# Patient Record
(demographics unavailable — no encounter records)

---

## 2024-10-23 NOTE — ASSESSMENT
[FreeTextEntry1] : sn loss stable au aids cerumen cleared  20 minutes spent with patient with exam and counseling excluding time for any procedure.

## 2024-10-23 NOTE — HISTORY OF PRESENT ILLNESS
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

## 2024-10-30 NOTE — HEALTH RISK ASSESSMENT
[1 or 2 (0 pts)] : 1 or 2 (0 points) [Never (0 pts)] : Never (0 points) [No] : In the past 12 months have you used drugs other than those required for medical reasons? No [No falls in past year] : Patient reported no falls in the past year [0] : 2) Feeling down, depressed, or hopeless: Not at all (0) [PHQ-2 Negative - No further assessment needed] : PHQ-2 Negative - No further assessment needed [Never] : Never [NO] : No [Patient reported mammogram was normal] : Patient reported mammogram was normal [HIV test declined] : HIV test declined [Hepatitis C test declined] : Hepatitis C test declined [Alone] : lives alone [Retired] : retired [College] : College [] :  [# Of Children ___] : has [unfilled] children [Feels Safe at Home] : Feels safe at home [Fully functional (bathing, dressing, toileting, transferring, walking, feeding)] : Fully functional (bathing, dressing, toileting, transferring, walking, feeding) [Fully functional (using the telephone, shopping, preparing meals, housekeeping, doing laundry, using] : Fully functional and needs no help or supervision to perform IADLs (using the telephone, shopping, preparing meals, housekeeping, doing laundry, using transportation, managing medications and managing finances) [Reports changes in hearing] : Reports changes in hearing [Reports normal functional visual acuity (ie: able to read med bottle)] : Reports normal functional visual acuity [Smoke Detector] : smoke detector [Carbon Monoxide Detector] : carbon monoxide detector [Seat Belt] :  uses seat belt [Sunscreen] : uses sunscreen [Very Good] : ~his/her~  mood as very good [Audit-CScore] : 0 [PXT3Omthe] : 0 [Change in mental status noted] : No change in mental status noted [Language] : denies difficulty with language [Behavior] : denies difficulty with behavior [Learning/Retaining New Information] : denies difficulty learning/retaining new information [Handling Complex Tasks] : denies difficulty handling complex tasks [Reasoning] : denies difficulty with reasoning [Spatial Ability and Orientation] : denies difficulty with spatial ability and orientation [Sexually Active] : not sexually active [Reports changes in vision] : Reports no changes in vision [Reports changes in dental health] : Reports no changes in dental health [Travel to Developing Areas] : does not  travel to developing areas [TB Exposure] : is not being exposed to tuberculosis [Caregiver Concerns] : does not have caregiver concerns [MammogramDate] : 04/24 [PapSmearComments] : unsure [ColonoscopyComments] : unsure

## 2025-01-08 NOTE — ASSESSMENT
[FreeTextEntry1] : sn loss stable au aids cerumen cleared f/u 2 mo  20 minutes spent with patient with exam and counseling excluding time for any procedure.

## 2025-01-08 NOTE — REVIEW OF SYSTEMS
[Patient Intake Form Reviewed] : Patient intake form was reviewed [Negative] : Ear [de-identified] : wax build up

## 2025-02-18 NOTE — CONSULT LETTER
[Dear  ___] : Dear  [unfilled], [Consult Letter:] : I had the pleasure of evaluating your patient, [unfilled]. [Sincerely,] : Sincerely, [DrMarimar  ___] : Dr. COURTNEY

## 2025-02-20 NOTE — HISTORY OF PRESENT ILLNESS
[FreeTextEntry1] : This is a 92 year old  female who was diagnosed with right breast cancer at age 52.  She states it was extensive intraductal, but "had broken into the tissue."  She had a mastectomy with removal of lymph nodes and an expander reconstruction at [facility] in [place].  The expander was subsequently exchanged to a silicone implant.  She believes it was a textured implant. She did not receive any additional therapy.  She has also had a left breast biopsy for a benign lump in the past.  She was seen in Dec 2019 for a first visit.  She had noticed a change in the appearance of the right implant in the preceding 2 years and complained of a bulge in the lower inner breast. Ultrasound showed the right implant to likely be ruptured (could not tolerate MRI).  There was also a nodule in the left breast at 4-6:00 and an FNA was performed with benign results.  She had the right implant exchanged by Dr. Hardy.  She has a saline implant.    She had a left mammogram and ultrasound in April.  A six month follow-up was advised for a new probable debris filled cyst in the left breast.

## 2025-02-20 NOTE — DATA REVIEWED
[FreeTextEntry1] : Left mammogram ([facility])  4/8/2024:  Dense. Benign appearing nodularity unchanged.  Left breast ultrasound 4/8/2024:  Left 11N2 6 mm probable debris filled cyst- follow-up in 6 months. 4N2 oval hypoechoic mass 8 mm unchanged.   Left breast ultrasound 10/03/2024:  Left 11N2 contiguous intramammary lymph nodes 6x6x2 mm.

## 2025-02-20 NOTE — PHYSICAL EXAM
[EOMI] : extra ocular movement intact [Sclera nonicteric] : sclera nonicteric [Supple] : supple [No Supraclavicular Adenopathy] : no supraclavicular adenopathy [No Cervical Adenopathy] : no cervical adenopathy [Clear to Auscultation Bilat] : clear to auscultation bilaterally [Examined in the supine and seated position] : examined in the supine and seated position [No dominant masses] : no dominant masses in right breast  [No dominant masses] : no dominant masses left breast [No Nipple Retraction] : no left nipple retraction [No Nipple Discharge] : no left nipple discharge [No Axillary Lymphadenopathy] : no left axillary lymphadenopathy [Soft] : abdomen soft [Not Tender] : non-tender [No Palpable Masses] : no abdominal mass palpated [de-identified] : Low transverse neck scar. [de-identified] : Inverted T scar.  Implant. 1.5 cm round bulge in central implant. [de-identified] : Multiple seborrheic keratoses. [de-identified] : Axillary scar

## 2025-02-20 NOTE — PHYSICAL EXAM
[EOMI] : extra ocular movement intact [Sclera nonicteric] : sclera nonicteric [Supple] : supple [No Supraclavicular Adenopathy] : no supraclavicular adenopathy [No Cervical Adenopathy] : no cervical adenopathy [Clear to Auscultation Bilat] : clear to auscultation bilaterally [Examined in the supine and seated position] : examined in the supine and seated position [No dominant masses] : no dominant masses in right breast  [No dominant masses] : no dominant masses left breast [No Nipple Retraction] : no left nipple retraction [No Nipple Discharge] : no left nipple discharge [No Axillary Lymphadenopathy] : no left axillary lymphadenopathy [Soft] : abdomen soft [Not Tender] : non-tender [No Palpable Masses] : no abdominal mass palpated [de-identified] : Low transverse neck scar. [de-identified] : Inverted T scar.  Implant. 1.5 cm round bulge in central implant. [de-identified] : Multiple seborrheic keratoses. [de-identified] : Axillary scar

## 2025-02-20 NOTE — PAST MEDICAL HISTORY
[Menarche Age ____] : age at menarche was [unfilled] [Total Preg ___] : G[unfilled] [Live Births ___] : P[unfilled]  [Age At Live Birth ___] : Age at live birth: [unfilled] [FreeTextEntry2] : 3 sons [FreeTextEntry7] : BCP x few months

## 2025-02-20 NOTE — HISTORY OF PRESENT ILLNESS
[FreeTextEntry1] : This is a 92 year old  female who was diagnosed with right breast cancer at age 52.  She states it was extensive intraductal, but "had broken into the tissue."  She had a mastectomy with removal of lymph nodes and an expander reconstruction at [facility] in [place].  The expander was subsequently exchanged to a silicone implant.  She believes it was a textured implant. She did not receive any additional therapy.  She has also had a left breast biopsy for a benign lump in the past.  She was seen in Dec 2019 for a first visit.  She had noticed a change in the appearance of the right implant in the preceding 2 years and complained of a bulge in the lower inner breast. Ultrasound showed the right implant to likely be ruptured (could not tolerate MRI).  There was also a nodule in the left breast at 4-6:00 and an FNA was performed with benign results.  She had the right implant exchanged by Dr. Hardy.  She has a saline implant.    She had a left mammogram and ultrasound in April.  A six month follow-up was advised for a new probable debris filled cyst in the left breast.

## 2025-03-05 NOTE — REVIEW OF SYSTEMS
[Patient Intake Form Reviewed] : Patient intake form was reviewed [Negative] : Ear [de-identified] : bilateral built up of wax

## 2025-03-05 NOTE — PHYSICAL EXAM
[de-identified] : yovany [de-identified] : anterior nose dry mucosa [Midline] : trachea located in midline position [Normal] : no rashes

## 2025-03-31 NOTE — HISTORY OF PRESENT ILLNESS
[Asthma] : Asthma [Hypertension] : Hypertension [FreeTextEntry6] : pt is here for fasting blood work recheck

## 2025-05-02 NOTE — PHYSICAL EXAM
[EOMI] : extra ocular movement intact [Sclera nonicteric] : sclera nonicteric [Supple] : supple [No Supraclavicular Adenopathy] : no supraclavicular adenopathy [No Cervical Adenopathy] : no cervical adenopathy [Clear to Auscultation Bilat] : clear to auscultation bilaterally [Examined in the supine and seated position] : examined in the supine and seated position [No dominant masses] : no dominant masses in right breast  [No dominant masses] : no dominant masses left breast [No Nipple Retraction] : no left nipple retraction [No Nipple Discharge] : no left nipple discharge [No Axillary Lymphadenopathy] : no left axillary lymphadenopathy [Soft] : abdomen soft [Not Tender] : non-tender [No Palpable Masses] : no abdominal mass palpated [de-identified] : Low transverse neck scar. [de-identified] : Inverted T scar.  Implant. 1.5 cm round bulge in central implant with disc-like area in capsule. [de-identified] : Multiple seborrheic keratoses. [de-identified] : Axillary scar

## 2025-05-02 NOTE — DATA REVIEWED
[FreeTextEntry1] : Left mammogram ([facility])  4/23/2025:  Dense. No evidence of malignancy.  Left breast ultrasound 4/3/2025:  Left 4N2 oval hypoechoic mass 7 mm unchanged.

## 2025-05-02 NOTE — HISTORY OF PRESENT ILLNESS
[FreeTextEntry1] : This is a 92 year old  female who was diagnosed with right breast cancer at age 52.  She states it was extensive intraductal, but "had broken into the tissue."  She had a mastectomy with removal of lymph nodes and an expander reconstruction at [facility] in [place].  The expander was subsequently exchanged to a silicone implant.  She believes it was a textured implant. She did not receive any additional therapy.  She has also had a left breast biopsy for a benign lump in the past.  She was seen in Dec 2019 for a first visit.  She had noticed a change in the appearance of the right implant in the preceding 2 years and complained of a bulge in the lower inner breast. Ultrasound showed the right implant to likely be ruptured (could not tolerate MRI).  There was also a nodule in the left breast at 4-6:00 and an FNA was performed with benign results.  She had the right implant exchanged by Dr. Hardy.  She has a saline implant.    She has no present breast complaints.

## 2025-07-09 NOTE — HISTORY OF PRESENT ILLNESS
[de-identified] : hearing loss  Presents for ear f/u Has b/l aud aids Co ear plugging No changes to hearing bilateral nose bleeds-improved w lubrication

## 2025-07-09 NOTE — ASSESSMENT
[FreeTextEntry1] : Large amount cerumen cleared each ear canal. Ear canals and tympanic membranes  unremarkable. symptoms relieved. F/u  for cerumen treatment  . f/u 2 mo  20 minutes spent with patient with exam and counseling excluding time for any procedure.